# Patient Record
Sex: MALE | Race: WHITE | NOT HISPANIC OR LATINO | Employment: UNEMPLOYED | URBAN - METROPOLITAN AREA
[De-identification: names, ages, dates, MRNs, and addresses within clinical notes are randomized per-mention and may not be internally consistent; named-entity substitution may affect disease eponyms.]

---

## 2022-05-10 ENCOUNTER — TELEPHONE (OUTPATIENT)
Dept: INTERNAL MEDICINE | Facility: CLINIC | Age: 52
End: 2022-05-10

## 2022-05-10 ENCOUNTER — OFFICE VISIT (OUTPATIENT)
Dept: INTERNAL MEDICINE | Facility: CLINIC | Age: 52
End: 2022-05-10
Payer: MEDICARE

## 2022-05-10 ENCOUNTER — LAB VISIT (OUTPATIENT)
Dept: LAB | Facility: HOSPITAL | Age: 52
End: 2022-05-10
Attending: INTERNAL MEDICINE
Payer: MEDICARE

## 2022-05-10 ENCOUNTER — TELEPHONE (OUTPATIENT)
Dept: INTERNAL MEDICINE | Facility: CLINIC | Age: 52
End: 2022-05-10
Payer: MEDICARE

## 2022-05-10 VITALS
HEART RATE: 96 BPM | OXYGEN SATURATION: 94 % | WEIGHT: 315 LBS | DIASTOLIC BLOOD PRESSURE: 60 MMHG | SYSTOLIC BLOOD PRESSURE: 147 MMHG | HEIGHT: 69 IN | BODY MASS INDEX: 46.65 KG/M2

## 2022-05-10 DIAGNOSIS — C92.10 CML (CHRONIC MYELOCYTIC LEUKEMIA): ICD-10-CM

## 2022-05-10 DIAGNOSIS — J43.8 OTHER EMPHYSEMA: ICD-10-CM

## 2022-05-10 DIAGNOSIS — E11.8 TYPE 2 DIABETES WITH COMPLICATION: ICD-10-CM

## 2022-05-10 DIAGNOSIS — I73.9 CLAUDICATION: Primary | ICD-10-CM

## 2022-05-10 DIAGNOSIS — I10 HTN (HYPERTENSION), BENIGN: ICD-10-CM

## 2022-05-10 PROBLEM — J44.9 COPD (CHRONIC OBSTRUCTIVE PULMONARY DISEASE): Status: ACTIVE | Noted: 2022-05-10

## 2022-05-10 PROBLEM — E11.9 TYPE 2 DIABETES MELLITUS, WITHOUT LONG-TERM CURRENT USE OF INSULIN: Status: ACTIVE | Noted: 2022-05-10

## 2022-05-10 LAB
ALBUMIN SERPL BCP-MCNC: 3.5 G/DL (ref 3.5–5.2)
ALP SERPL-CCNC: 144 U/L (ref 55–135)
ALT SERPL W/O P-5'-P-CCNC: 27 U/L (ref 10–44)
ANION GAP SERPL CALC-SCNC: 11 MMOL/L (ref 8–16)
AST SERPL-CCNC: 23 U/L (ref 10–40)
BASOPHILS NFR BLD: 1 % (ref 0–1.9)
BILIRUB SERPL-MCNC: 0.3 MG/DL (ref 0.1–1)
BUN SERPL-MCNC: 18 MG/DL (ref 6–20)
CALCIUM SERPL-MCNC: 10.4 MG/DL (ref 8.7–10.5)
CHLORIDE SERPL-SCNC: 99 MMOL/L (ref 95–110)
CO2 SERPL-SCNC: 27 MMOL/L (ref 23–29)
CREAT SERPL-MCNC: 1.6 MG/DL (ref 0.5–1.4)
DIFFERENTIAL METHOD: ABNORMAL
EOSINOPHIL NFR BLD: 1 % (ref 0–8)
ERYTHROCYTE [DISTWIDTH] IN BLOOD BY AUTOMATED COUNT: 15.6 % (ref 11.5–14.5)
EST. GFR  (AFRICAN AMERICAN): 57.2 ML/MIN/1.73 M^2
EST. GFR  (NON AFRICAN AMERICAN): 49.5 ML/MIN/1.73 M^2
ESTIMATED AVG GLUCOSE: 255 MG/DL (ref 68–131)
GLUCOSE SERPL-MCNC: 328 MG/DL (ref 70–110)
HBA1C MFR BLD: 10.5 % (ref 4–5.6)
HCT VFR BLD AUTO: 48.5 % (ref 40–54)
HGB BLD-MCNC: 15.5 G/DL (ref 14–18)
IMM GRANULOCYTES # BLD AUTO: ABNORMAL K/UL (ref 0–0.04)
IMM GRANULOCYTES NFR BLD AUTO: ABNORMAL % (ref 0–0.5)
LYMPHOCYTES NFR BLD: 9 % (ref 18–48)
MCH RBC QN AUTO: 30.1 PG (ref 27–31)
MCHC RBC AUTO-ENTMCNC: 32 G/DL (ref 32–36)
MCV RBC AUTO: 94 FL (ref 82–98)
METAMYELOCYTES NFR BLD MANUAL: 5 %
MONOCYTES NFR BLD: 6 % (ref 4–15)
MYELOCYTES NFR BLD MANUAL: 9 %
NEUTROPHILS NFR BLD: 58 % (ref 38–73)
NEUTS BAND NFR BLD MANUAL: 6 %
NRBC BLD-RTO: 0 /100 WBC
PLATELET # BLD AUTO: 203 K/UL (ref 150–450)
PLATELET BLD QL SMEAR: ABNORMAL
PMV BLD AUTO: 12.9 FL (ref 9.2–12.9)
POLYCHROMASIA BLD QL SMEAR: ABNORMAL
POTASSIUM SERPL-SCNC: 4.4 MMOL/L (ref 3.5–5.1)
PROMYELOCYTES NFR BLD MANUAL: 3 %
PROT SERPL-MCNC: 6.7 G/DL (ref 6–8.4)
RBC # BLD AUTO: 5.15 M/UL (ref 4.6–6.2)
SODIUM SERPL-SCNC: 137 MMOL/L (ref 136–145)
WBC # BLD AUTO: 51.61 K/UL (ref 3.9–12.7)
WBC OTHER NFR BLD MANUAL: 2 %

## 2022-05-10 PROCEDURE — 99203 OFFICE O/P NEW LOW 30 MIN: CPT | Mod: S$GLB,,, | Performed by: INTERNAL MEDICINE

## 2022-05-10 PROCEDURE — 83036 HEMOGLOBIN GLYCOSYLATED A1C: CPT | Performed by: INTERNAL MEDICINE

## 2022-05-10 PROCEDURE — 99999 PR PBB SHADOW E&M-NEW PATIENT-LVL V: ICD-10-PCS | Mod: PBBFAC,,, | Performed by: INTERNAL MEDICINE

## 2022-05-10 PROCEDURE — 36415 COLL VENOUS BLD VENIPUNCTURE: CPT | Performed by: INTERNAL MEDICINE

## 2022-05-10 PROCEDURE — 3078F PR MOST RECENT DIASTOLIC BLOOD PRESSURE < 80 MM HG: ICD-10-PCS | Mod: CPTII,S$GLB,, | Performed by: INTERNAL MEDICINE

## 2022-05-10 PROCEDURE — 99499 UNLISTED E&M SERVICE: CPT | Mod: S$GLB,,, | Performed by: INTERNAL MEDICINE

## 2022-05-10 PROCEDURE — 1160F RVW MEDS BY RX/DR IN RCRD: CPT | Mod: CPTII,S$GLB,, | Performed by: INTERNAL MEDICINE

## 2022-05-10 PROCEDURE — 85060 BLOOD SMEAR INTERPRETATION: CPT | Mod: ,,, | Performed by: PATHOLOGY

## 2022-05-10 PROCEDURE — 85027 COMPLETE CBC AUTOMATED: CPT | Performed by: INTERNAL MEDICINE

## 2022-05-10 PROCEDURE — 3077F SYST BP >= 140 MM HG: CPT | Mod: CPTII,S$GLB,, | Performed by: INTERNAL MEDICINE

## 2022-05-10 PROCEDURE — 80053 COMPREHEN METABOLIC PANEL: CPT | Performed by: INTERNAL MEDICINE

## 2022-05-10 PROCEDURE — 3077F PR MOST RECENT SYSTOLIC BLOOD PRESSURE >= 140 MM HG: ICD-10-PCS | Mod: CPTII,S$GLB,, | Performed by: INTERNAL MEDICINE

## 2022-05-10 PROCEDURE — 85007 BL SMEAR W/DIFF WBC COUNT: CPT | Performed by: INTERNAL MEDICINE

## 2022-05-10 PROCEDURE — 85060 PATHOLOGIST REVIEW: ICD-10-PCS | Mod: ,,, | Performed by: PATHOLOGY

## 2022-05-10 PROCEDURE — 99999 PR PBB SHADOW E&M-NEW PATIENT-LVL V: CPT | Mod: PBBFAC,,, | Performed by: INTERNAL MEDICINE

## 2022-05-10 PROCEDURE — 1159F PR MEDICATION LIST DOCUMENTED IN MEDICAL RECORD: ICD-10-PCS | Mod: CPTII,S$GLB,, | Performed by: INTERNAL MEDICINE

## 2022-05-10 PROCEDURE — 1159F MED LIST DOCD IN RCRD: CPT | Mod: CPTII,S$GLB,, | Performed by: INTERNAL MEDICINE

## 2022-05-10 PROCEDURE — 99499 RISK ADDL DX/OHS AUDIT: ICD-10-PCS | Mod: S$GLB,,, | Performed by: INTERNAL MEDICINE

## 2022-05-10 PROCEDURE — 1160F PR REVIEW ALL MEDS BY PRESCRIBER/CLIN PHARMACIST DOCUMENTED: ICD-10-PCS | Mod: CPTII,S$GLB,, | Performed by: INTERNAL MEDICINE

## 2022-05-10 PROCEDURE — 3008F BODY MASS INDEX DOCD: CPT | Mod: CPTII,S$GLB,, | Performed by: INTERNAL MEDICINE

## 2022-05-10 PROCEDURE — 99203 PR OFFICE/OUTPT VISIT, NEW, LEVL III, 30-44 MIN: ICD-10-PCS | Mod: S$GLB,,, | Performed by: INTERNAL MEDICINE

## 2022-05-10 PROCEDURE — 4010F ACE/ARB THERAPY RXD/TAKEN: CPT | Mod: CPTII,S$GLB,, | Performed by: INTERNAL MEDICINE

## 2022-05-10 PROCEDURE — 3078F DIAST BP <80 MM HG: CPT | Mod: CPTII,S$GLB,, | Performed by: INTERNAL MEDICINE

## 2022-05-10 PROCEDURE — 4010F PR ACE/ARB THEARPY RXD/TAKEN: ICD-10-PCS | Mod: CPTII,S$GLB,, | Performed by: INTERNAL MEDICINE

## 2022-05-10 PROCEDURE — 3008F PR BODY MASS INDEX (BMI) DOCUMENTED: ICD-10-PCS | Mod: CPTII,S$GLB,, | Performed by: INTERNAL MEDICINE

## 2022-05-10 RX ORDER — METFORMIN HYDROCHLORIDE 500 MG/1
500 TABLET ORAL 2 TIMES DAILY WITH MEALS
COMMUNITY

## 2022-05-10 RX ORDER — LISINOPRIL 10 MG/1
10 TABLET ORAL DAILY
COMMUNITY

## 2022-05-10 RX ORDER — POTASSIUM CHLORIDE 750 MG/1
10 TABLET, EXTENDED RELEASE ORAL ONCE
COMMUNITY

## 2022-05-10 RX ORDER — GABAPENTIN 300 MG/1
300 CAPSULE ORAL 3 TIMES DAILY
Qty: 30 CAPSULE | Refills: 0 | Status: SHIPPED | OUTPATIENT
Start: 2022-05-10

## 2022-05-10 RX ORDER — SEMAGLUTIDE 1.34 MG/ML
INJECTION, SOLUTION SUBCUTANEOUS
COMMUNITY

## 2022-05-10 RX ORDER — DAPAGLIFLOZIN 10 MG/1
10 TABLET, FILM COATED ORAL DAILY
COMMUNITY

## 2022-05-10 RX ORDER — FUROSEMIDE 20 MG/1
20 TABLET ORAL DAILY
COMMUNITY

## 2022-05-10 RX ORDER — ATENOLOL 25 MG/1
25 TABLET ORAL DAILY
COMMUNITY

## 2022-05-10 NOTE — TELEPHONE ENCOUNTER
Explained new appointments info and gave pt the AVS, asked him to f/u w/ Pulmonology staff for an appointment sooner than August.

## 2022-05-10 NOTE — PROGRESS NOTES
"Subjective:       Patient ID: Sarbjit Stevens is a 51 y.o. male.    Chief Complaint: Leg Swelling, Shortness of Breath, and Leg Pain      Pt and problems are new to me.    Sarbjit Stevens is 51 y.o. male who presents for with burning leg pain with walking X 4 years worse over last 1 month.  Pt was evaluated by vascular surgery and had evaluation with normal US legs but was told MRI with dye.      Pt has Type II DM, HTN, COPD, and CML.  Pt moved here from [place] and has not been seen by primary care provider since he has been in [place].  Pt stopped all his medication in Nov 2021 and restarted them 5 days ago because he was tired of being a "druggy".  Pt states he just saw an eye doctor who told him that he had diabetic retinopathy.  Pt is not monitoring his blood sugar because he broke his glucometer.        Review of Systems   Constitutional: Positive for chills. Negative for fever. Diaphoresis: at night X 1 month.   Respiratory: Positive for shortness of breath (at rest and on exertion, chronic, worse over last month).    Cardiovascular: Positive for leg swelling (bilateral).   Gastrointestinal: Negative for abdominal pain, constipation, diarrhea, nausea and vomiting.         Objective:      Physical Exam  Vitals reviewed.   Constitutional:       General: He is awake.      Appearance: He is morbidly obese.   HENT:      Head: Normocephalic and atraumatic.      Mouth/Throat:      Mouth: Mucous membranes are moist.      Pharynx: Oropharynx is clear.   Eyes:      Extraocular Movements: Extraocular movements intact.      Conjunctiva/sclera: Conjunctivae normal.      Pupils: Pupils are equal, round, and reactive to light.   Cardiovascular:      Rate and Rhythm: Normal rate and regular rhythm.      Heart sounds: No murmur heard.    No friction rub. No gallop.   Pulmonary:      Effort: Pulmonary effort is normal.      Breath sounds: Normal breath sounds.   Abdominal:      General: Bowel sounds are normal. There is no " distension.      Tenderness: There is no abdominal tenderness. There is no guarding or rebound.   Musculoskeletal:      Right lower leg: Edema (Trace) present.      Left lower leg: Edema (trace) present.   Lymphadenopathy:      Cervical: No cervical adenopathy.   Neurological:      Mental Status: He is alert and oriented to person, place, and time.   Psychiatric:         Mood and Affect: Mood normal.         Behavior: Behavior is cooperative.         Assessment:       1. Claudication    2. HTN (hypertension), benign    3. CML (chronic myelocytic leukemia)    4. Other emphysema    5. Type 2 diabetes with complication        Plan:     Pt with multiple medical problems who has been unable to establish primary care.  Pt was set up for appointment with possible PCP on Monday, April 16.  Pt c/o of LE numbness and pain affecting his walking.  Pt was being evaluated by vascular surgery in [place] prior to moving but did not complete evaluation. Will refer to vascular surgery for further evaluation.  Pt's leg pain is most likely secondary to DM peripheral neuropathy.  Will tx with gabapentin 300 mg po QHS.      Type II DM:  Pt with type II DM but stopped all his medication in Nov and restarted them 5 days ago.  Pt counseled that stopping his medication would lead to uncontrolled DM causing all the DM complications and could lead to death.  Will check HgbA1C so lab is available for PCP apt.    HTN:  Pt stopped BP medication and just restarted it 5 days ago.  BP can be recheck at PCP apt when pt has been on medication for longer period of time.      CML:  Pt report hx of leukemia most likely CML.  Will get CBC and CMP for further evaluation.  Pt can be referred to heme/onc by PCP.    COPD:  Pt with know moderate COPD with complaint of worsening SOB over the last month.  Lungs today are CTA so pt is not having COPD exacerbation.  Could be worsening of baseline disease vs patient stopping his medication.  Pt referred to  pulmonary for further evaluation and treatment.      Patient was advised to follow up if symptom are not improving or worsened.      Sarbjit was seen today for leg swelling, shortness of breath and leg pain.    Diagnoses and all orders for this visit:    Claudication  -     Ambulatory referral/consult to Vascular Surgery; Future    HTN (hypertension), benign    CML (chronic myelocytic leukemia)  -     CBC Auto Differential; Future  -     Comprehensive Metabolic Panel; Future    Other emphysema  -     Ambulatory referral/consult to Pulmonology; Future    Type 2 diabetes with complication  -     gabapentin (NEURONTIN) 300 MG capsule; Take 1 capsule (300 mg total) by mouth 3 (three) times daily.  -     Hemoglobin A1C; Future

## 2022-05-10 NOTE — PROGRESS NOTES
All appointments were scheduled and will follow w/ Pulmonology dept to try to get pt in before August 2022.

## 2022-05-11 ENCOUNTER — TELEPHONE (OUTPATIENT)
Dept: PULMONOLOGY | Facility: CLINIC | Age: 52
End: 2022-05-11
Payer: MEDICARE

## 2022-05-11 ENCOUNTER — TELEPHONE (OUTPATIENT)
Dept: HEMATOLOGY/ONCOLOGY | Facility: CLINIC | Age: 52
End: 2022-05-11
Payer: MEDICARE

## 2022-05-11 ENCOUNTER — INITIAL CONSULT (OUTPATIENT)
Dept: VASCULAR SURGERY | Facility: CLINIC | Age: 52
End: 2022-05-11
Payer: MEDICARE

## 2022-05-11 ENCOUNTER — PATIENT MESSAGE (OUTPATIENT)
Dept: INTERNAL MEDICINE | Facility: CLINIC | Age: 52
End: 2022-05-11
Payer: MEDICARE

## 2022-05-11 VITALS
HEIGHT: 69 IN | DIASTOLIC BLOOD PRESSURE: 78 MMHG | SYSTOLIC BLOOD PRESSURE: 150 MMHG | WEIGHT: 315 LBS | BODY MASS INDEX: 46.65 KG/M2

## 2022-05-11 DIAGNOSIS — C92.10 CML (CHRONIC MYELOCYTIC LEUKEMIA): Primary | ICD-10-CM

## 2022-05-11 DIAGNOSIS — I73.9 CLAUDICATION: ICD-10-CM

## 2022-05-11 DIAGNOSIS — I87.2 VENOUS INSUFFICIENCY: ICD-10-CM

## 2022-05-11 LAB — PATH REV BLD -IMP: NORMAL

## 2022-05-11 PROCEDURE — 99999 PR PBB SHADOW E&M-EST. PATIENT-LVL III: ICD-10-PCS | Mod: PBBFAC,,, | Performed by: SURGERY

## 2022-05-11 PROCEDURE — 99999 PR PBB SHADOW E&M-EST. PATIENT-LVL III: CPT | Mod: PBBFAC,,, | Performed by: SURGERY

## 2022-05-11 PROCEDURE — 99204 PR OFFICE/OUTPT VISIT, NEW, LEVL IV, 45-59 MIN: ICD-10-PCS | Mod: S$GLB,,, | Performed by: SURGERY

## 2022-05-11 PROCEDURE — 99204 OFFICE O/P NEW MOD 45 MIN: CPT | Mod: S$GLB,,, | Performed by: SURGERY

## 2022-05-11 NOTE — TELEPHONE ENCOUNTER
Tc to pt  Spoke w/ spouse Informed her that we received a referral to evaluate pt for CLL  Nurse inquired as to where was previous treatment received so we may obtain a copy of records prior to scheduling He was treated in [place] Requested that patient come into office any day between 8:00-4:00 to sign a release of info        Reminder call to patient to sign release of information so we may obtain his records from [place]  And schedule him for an evaluation he stated his wife is coming in today to sign release

## 2022-05-11 NOTE — TELEPHONE ENCOUNTER
Scheduled an appointment to see provider.    JALEEL Diaz              ----- Message from Emily Perez MA sent at 5/11/2022  7:53 AM CDT -----  Please schedule pt for a sooner appointment. Thank you Jazmín

## 2022-05-11 NOTE — PROGRESS NOTES
Corey García MD VI                       [facility]                         05/11/2022    HPI:  Sarbjit Stevens is a 51 y.o. male with   Patient Active Problem List   Diagnosis    HTN (hypertension), benign    CML (chronic myelocytic leukemia)    COPD (chronic obstructive pulmonary disease)    Type 2 diabetes mellitus, without long-term current use of insulin    being managed by PCP and specialists who is here today for evaluation of PVD.  Patient has complaints of leg pain at rest and when walking/standing.  Patient states location is BLE occurring for months.  Associated signs and symptoms include pain, spider veins.  Quality is heavy and severity is 7/10.  Symptoms began mo ago.  Alleviating factors include elevation.  Worsening factors include dependency.  no rest pain.  no tissue loss.  Patient is diabetic.  Is not on Pletal.  no previous lower extremity interventions.    no MI  no Stroke  Tobacco use: yes  Daily Aspirin: yes  Anticoagulation: no    No past medical history on file.  No past surgical history on file.  No family history on file.  Social History     Socioeconomic History    Marital status:    Tobacco Use    Smoking status: Heavy Tobacco Smoker     Packs/day: 3.00     Years: 48.00     Pack years: 144.00     Start date: 1/1/1973    Smokeless tobacco: Never Used       Current Outpatient Medications:     furosemide (LASIX) 20 MG tablet, Take 20 mg by mouth once daily. Pt states he is supposed to take twice per day but takes it once per day, Disp: , Rfl:     gabapentin (NEURONTIN) 300 MG capsule, Take 1 capsule (300 mg total) by mouth 3 (three) times daily., Disp: 30 capsule, Rfl: 0    lisinopriL 10 MG tablet, Take 10 mg by mouth once daily., Disp: , Rfl:     metFORMIN (GLUCOPHAGE) 500 MG tablet, Take 500 mg by mouth 2 (two) times daily with meals., Disp: , Rfl:     potassium chloride (KLOR-CON) 10 MEQ TbSR, Take 10 mEq by mouth once., Disp: , Rfl:      semaglutide (OZEMPIC) 0.25 mg or 0.5 mg(2 mg/1.5 mL) pen injector, Inject into the skin every 7 days., Disp: , Rfl:     atenoloL (TENORMIN) 25 MG tablet, Take 25 mg by mouth once daily., Disp: , Rfl:     dapagliflozin (FARXIGA) 10 mg tablet, Take 10 mg by mouth once daily., Disp: , Rfl:     dasatinib (SPRYCEL) 140 mg Tab, Take 140 mg by mouth., Disp: , Rfl:     REVIEW OF SYSTEMS:  General: No fevers or chills; ENT: No sore throat; Allergy and Immunology: no persistent infections; Hematological and Lymphatic: No history of bleeding or easy bruising; Endocrine: negative; Respiratory: no cough, shortness of breath, or wheezing; Cardiovascular: no chest pain or dyspnea on exertion; + venous claudication, no rest pain; Gastrointestinal: no abdominal pain/back, change in bowel habits, or bloody stools; Genito-Urinary: no dysuria, trouble voiding, or hematuria; Musculoskeletal: negative, no wound; Neurological: no TIA or stroke symptoms; Psychiatric: no nervousness, anxiety or depression.    PHYSICAL EXAM:                General appearance:  Alert, well-appearing, and in no distress.  Oriented to person, place, and time                    Neurological: Normal speech, no focal findings noted; CN II - XII grossly intact. RLE with sensation to light touch, LLE with sensation to light touch.            Musculoskeletal: Digits/nail without cyanosis/clubbing.  Strength 5/5 BLE.                    Neck: Supple, no significant adenopathy, no carotid bruit can be auscultated                  Chest:  Clear to auscultation, no wheezes, rales or rhonchi, symmetric air entry. No use of accessory muscles               Cardiac: Normal rate and regular rhythm, S1 and S2 normal            Abdomen: Soft, nontender, nondistended, no masses or organomegaly, no hernia     No rebound tenderness noted; bowel sounds normal     Pulsatile aortic mass is non palpable.     No groin adenopathy      Extremities:     2+ R DP pulse, 2+ L DP  pulse     2+ RLE edema, 2+ LLE edema    Skin: RLE no tissue loss; LLE no tissue loss    LAB RESULTS:  No results found for: CBC  No results found for: LABPROT, INR  Lab Results   Component Value Date     05/10/2022    K 4.4 05/10/2022    CL 99 05/10/2022    CO2 27 05/10/2022     (H) 05/10/2022    BUN 18 05/10/2022    CREATININE 1.6 (H) 05/10/2022    CALCIUM 10.4 05/10/2022    ANIONGAP 11 05/10/2022    EGFRNONAA 49.5 (A) 05/10/2022     Lab Results   Component Value Date    WBC 51.61 (HH) 05/10/2022    RBC 5.15 05/10/2022    HGB 15.5 05/10/2022    HCT 48.5 05/10/2022    MCV 94 05/10/2022    MCH 30.1 05/10/2022    MCHC 32.0 05/10/2022    RDW 15.6 (H) 05/10/2022     05/10/2022    MPV 12.9 05/10/2022    GRAN 58.0 05/10/2022    LYMPH 9.0 (L) 05/10/2022    MONO 6.0 05/10/2022    EOSINOPHIL 1.0 05/10/2022    BASOPHIL 1.0 05/10/2022    DIFFMETHOD Manual 05/10/2022     .  Lab Results   Component Value Date    HGBA1C 10.5 (H) 05/10/2022       IMAGING:  All pertinent imaging has been reviewed and interpreted independently.    IMP/PLAN:  51 y.o. male with   Patient Active Problem List   Diagnosis    HTN (hypertension), benign    CML (chronic myelocytic leukemia)    COPD (chronic obstructive pulmonary disease)    Type 2 diabetes mellitus, without long-term current use of insulin    being managed by PCP and specialists who is here today for evaluation of PVD.    -Minimal concern for PVD as etiology of symptoms, appear to be due to venous and lymphatic etiology with venous claudication, no rest pain, no wound.  Imaging reviewed. - recommend compression with stockings, elevation, dietary changes associated with water and sodium intake discussed at length with patient  -Patient has secondary lymphedema and has tried and failed compression of 20-30 mm Hg, elevation and exercise for >1 month period however symptoms persist.  Basic pump trial performed and discontinued due to sensitivity and pain to static  pressure.  Symptoms of swelling, pain and hyperplasia present.  A compression device is recommended to treat current symptoms and prevent disease progression. - recommend lymphedema clinic therapy and pumps  -Arterial BLE US and NISHA/TP  -Exercise  -Heart healthy lifestyle  -RTC 3 mo    I spent 12 minutes evaluating this patient and greater than 50% of the time was spent counseling, coordinator care and discussing the plan of care.  All questions were answered and patient stated understanding with agreement with the above treatment plan.    Corey García MD [facility]  Vascular and Endovascular Surgery

## 2022-05-11 NOTE — PROGRESS NOTES
Patient, Sarbjit Stevens (MRN #[id number]), presented with a recorded BMI of 47.52 kg/m^2 consistent with the definition of morbid obesity (ICD-10 E66.01). The patient's morbid obesity was monitored, evaluated, addressed and/or treated. This addendum to the medical record is made on 05/11/2022.

## 2022-05-11 NOTE — TELEPHONE ENCOUNTER
Patient with ?hx of CML per his and wife's report.  WBC 58 on labs yesterday.  Pt was referred to Heme/onc based on critical lab.  Pt also with HgbA1C 10 and has apt with new PCP on May 16.

## 2022-05-16 ENCOUNTER — TELEPHONE (OUTPATIENT)
Dept: HEMATOLOGY/ONCOLOGY | Facility: CLINIC | Age: 52
End: 2022-05-16
Payer: MEDICARE

## 2022-05-16 NOTE — TELEPHONE ENCOUNTER
TC to spouse  In am  Phone not taking calls    TC to pt spouse in pm Not taking calls   \    TC to pt  He prefers we call spouse with everything but since we cannot reach spouse I called and left a VM for him to have spouse to contact office as the information she gave for me to send release of information is an incorrect number     Letter mailed to pt on this date to request they obtain correct fax and phone number for the facility in [place] so we may forward release of information

## 2022-05-17 ENCOUNTER — TELEPHONE (OUTPATIENT)
Dept: FAMILY MEDICINE | Facility: CLINIC | Age: 52
End: 2022-05-17
Payer: MEDICARE

## 2022-05-17 NOTE — TELEPHONE ENCOUNTER
Called pt to advise about needing a clearance to be cleared for dental services. spoke with pt wife. Advise wife that patient needs to be seen told pt wife what available appointments we have next. Wife got upset and started cursing at me and stated how she hates [facility] and that we are all F''''ing stupid and that she (used foul language again) hates [facility] so much and that her  hates [facility] to and hung up.      Advised pt that  missed appointment on 5/16/22 and she stated that she thought the appointment was for 3:30 when appointment was actually for 3:20 pm. Pt and wife was advised to reschedule being past [place] period. And apparently did not rescheduled.

## 2022-06-03 ENCOUNTER — HOSPITAL ENCOUNTER (EMERGENCY)
Facility: HOSPITAL | Age: 52
Discharge: HOME | End: 2022-06-03
Attending: EMERGENCY MEDICINE
Payer: MEDICARE

## 2022-06-03 VITALS
DIASTOLIC BLOOD PRESSURE: 81 MMHG | SYSTOLIC BLOOD PRESSURE: 141 MMHG | WEIGHT: 310 LBS | HEIGHT: 69 IN | TEMPERATURE: 98 F | RESPIRATION RATE: 16 BRPM | HEART RATE: 80 BPM | BODY MASS INDEX: 45.91 KG/M2 | OXYGEN SATURATION: 95 %

## 2022-06-03 DIAGNOSIS — M54.41 ACUTE RIGHT-SIDED LOW BACK PAIN WITH RIGHT-SIDED SCIATICA: Primary | ICD-10-CM

## 2022-06-03 PROCEDURE — 63600175 PHARM REV CODE 636 W HCPCS: Performed by: EMERGENCY MEDICINE

## 2022-06-03 PROCEDURE — 25000003 PHARM REV CODE 250: Performed by: EMERGENCY MEDICINE

## 2022-06-03 PROCEDURE — 99284 EMERGENCY DEPT VISIT MOD MDM: CPT | Mod: 25

## 2022-06-03 RX ORDER — ACETAMINOPHEN 500 MG
1000 TABLET ORAL EVERY 6 HOURS PRN
Qty: 20 TABLET | Refills: 0 | Status: SHIPPED | OUTPATIENT
Start: 2022-06-03

## 2022-06-03 RX ORDER — LIDOCAINE 50 MG/G
1 PATCH TOPICAL DAILY
Qty: 14 PATCH | Refills: 0 | Status: SHIPPED | OUTPATIENT
Start: 2022-06-03

## 2022-06-03 RX ORDER — CYCLOBENZAPRINE HCL 10 MG
10 TABLET ORAL 3 TIMES DAILY PRN
Qty: 20 TABLET | Refills: 0 | Status: SHIPPED | OUTPATIENT
Start: 2022-06-03 | End: 2022-06-03 | Stop reason: SDUPTHER

## 2022-06-03 RX ORDER — LIDOCAINE 50 MG/G
1 PATCH TOPICAL ONCE
Status: DISCONTINUED | OUTPATIENT
Start: 2022-06-03 | End: 2022-06-03 | Stop reason: HOSPADM

## 2022-06-03 RX ORDER — DEXAMETHASONE 4 MG/1
4 TABLET ORAL
Status: COMPLETED | OUTPATIENT
Start: 2022-06-03 | End: 2022-06-03

## 2022-06-03 RX ORDER — NAPROXEN 500 MG/1
500 TABLET ORAL 2 TIMES DAILY PRN
Qty: 20 TABLET | Refills: 0 | Status: SHIPPED | OUTPATIENT
Start: 2022-06-03

## 2022-06-03 RX ORDER — CYCLOBENZAPRINE HCL 10 MG
10 TABLET ORAL ONCE
Status: COMPLETED | OUTPATIENT
Start: 2022-06-03 | End: 2022-06-03

## 2022-06-03 RX ORDER — OXYCODONE AND ACETAMINOPHEN 10; 325 MG/1; MG/1
1 TABLET ORAL
Status: COMPLETED | OUTPATIENT
Start: 2022-06-03 | End: 2022-06-03

## 2022-06-03 RX ORDER — CYCLOBENZAPRINE HCL 10 MG
10 TABLET ORAL 3 TIMES DAILY PRN
Qty: 20 TABLET | Refills: 0 | Status: SHIPPED | OUTPATIENT
Start: 2022-06-03 | End: 2022-06-08

## 2022-06-03 RX ADMIN — CYCLOBENZAPRINE 10 MG: 10 TABLET, FILM COATED ORAL at 11:06

## 2022-06-03 RX ADMIN — OXYCODONE AND ACETAMINOPHEN 1 TABLET: 10; 325 TABLET ORAL at 11:06

## 2022-06-03 RX ADMIN — LIDOCAINE 1 PATCH: 50 PATCH TOPICAL at 11:06

## 2022-06-03 RX ADMIN — DEXAMETHASONE 4 MG: 4 TABLET ORAL at 11:06

## 2022-06-03 NOTE — ED PROVIDER NOTES
------------------------------------------------------------------------------------------------------------------  Ness CHAVEZ, have reviewed the SORT/triage note.      History     Chief Complaint   Patient presents with    Fall     Pt to ER with c/o left sided hip pain radiating down leg s/p slip and fall two days ago. Pt denies hitting head when calling. No LOC.        HPI: 51 y.o. male presents to the ED with a chief complaint of back pain s/p fall in the grocery store 2 days ago. The patient reports pushing his cart in the store when he stepped on a brass plate with his right foot, leading to the brass plate lifting up and his right leg to twist. The patient complains of right sided lower back pain that starts around his hip region and radiates down to his right leg. Reports attempted treatment with epsom salt with mild relief, but notes that the pain worsened yesterday despite treatment. Reports attempting to go to the ED last night but left due to wait times, and states that his PCP advised him to come to the ED this morning. No exacerbating or alleviating factors noted. No other associated symptoms.     There is no report of urinary or bowel incontinence, f/c, n/v/d/c, IVDU, abdominal pain weakness, or sensory deficit.      PMHx   Diagnosis   Type 2 diabetes mellitus with diabetic polyneuropathy, without long-term current use of insulin (CMS/[facility])     Essential hypertension    Unspecified essential hypertension     Mixed hyperlipidemia     Chronic systolic congestive heart failure (CMS/[facility])    Chronic systolic heart failure     Stage 3a chronic kidney disease (CMS/[facility])     Panlobular emphysema (CMS/[facility])    Other emphysema     CML (chronic myelocytic leukemia) (CMS/[facility])    Chronic myeloid leukemia, without mention of having achieved remission     Tobacco abuse    Tobacco use disorder     Venous stasis dermatitis of both lower extremities     Gastroesophageal reflux disease without esophagitis     Esophageal reflux     Special screening, prostate cancer    Special screening for malignant neoplasm of prostate     Vitamin D deficiency disease    Unspecified vitamin D deficiency       Diagnosis   Type 2 diabetes mellitus with diabetic polyneuropathy, without long-term current use of insulin (CMS/HCC)     Essential hypertension    Unspecified essential hypertension     Mixed hyperlipidemia     Chronic systolic congestive heart failure (CMS/HCC)    Chronic systolic heart failure     Stage 3a chronic kidney disease (CMS/HCC)     Panlobular emphysema (CMS/HCC)    Other emphysema     CML (chronic myelocytic leukemia) (CMS/HCC)    Chronic myeloid leukemia, without mention of having achieved remission     Tobacco abuse    Tobacco use disorder     Venous stasis dermatitis of both lower extremities     Gastroesophageal reflux disease without esophagitis    Esophageal reflux     Special screening, prostate cancer    Special screening for malignant neoplasm of prostate     Vitamin D deficiency disease    Unspecified vitamin D deficiency         History reviewed. No pertinent surgical history.  Social History     Tobacco Use    Smoking status: Heavy Tobacco Smoker     Packs/day: 3.00     Years: 48.00     Pack years: 144.00     Start date: 1/1/1973    Smokeless tobacco: Never Used   Substance Use Topics    Alcohol use: Not Currently    Drug use: Not Currently     History reviewed. No pertinent family history.  Review of patient's allergies indicates:   Allergen Reactions    Opioids - morphine analogues     Penicillins     Sulfa (sulfonamide antibiotics)        Current Outpatient Medications:     atenoloL (TENORMIN) 25 MG tablet, Take 25 mg by mouth once daily., Disp: , Rfl:     dapagliflozin (FARXIGA) 10 mg tablet, Take 10 mg by mouth once daily., Disp: , Rfl:     dasatinib (SPRYCEL) 140 mg Tab, Take 140 mg by mouth., Disp: , Rfl:     furosemide (LASIX) 20 MG tablet, Take 20 mg by mouth once daily. Pt states he  "is supposed to take twice per day but takes it once per day, Disp: , Rfl:     gabapentin (NEURONTIN) 300 MG capsule, Take 1 capsule (300 mg total) by mouth 3 (three) times daily., Disp: 30 capsule, Rfl: 0    lisinopriL 10 MG tablet, Take 10 mg by mouth once daily., Disp: , Rfl:     metFORMIN (GLUCOPHAGE) 500 MG tablet, Take 500 mg by mouth 2 (two) times daily with meals., Disp: , Rfl:     potassium chloride (KLOR-CON) 10 MEQ TbSR, Take 10 mEq by mouth once., Disp: , Rfl:     semaglutide (OZEMPIC) 0.25 mg or 0.5 mg(2 mg/1.5 mL) pen injector, Inject into the skin every 7 days., Disp: , Rfl:      Review of Systems as per HPI  ROS  Constitutional: Negative for activity change, appetite change, fatigue, diaphoresis, chills and fever.   Respiratory: Negative for apnea, choking, chest tightness and wheezing.    Gastrointestinal: Negative for abdominal distention, constipation, diarrhea and nausea.   Genitourinary: Negative for dysuria, flank pain, frequency and hematuria.   Skin: Negative for color change, pallor, rash and wound.   Neurological: Negative for light-headedness, numbness, dizziness and headaches.   Musculoskeletal: Positive for back pain.   Psychiatric/Behavioral: Negative for agitation, behavioral problems, confusion, decreased concentration and dysphoric mood.     All other systems reviewed and are negative.    Physical Exam     ED Triage Vitals [06/03/22 1045]   Enc Vitals Group      BP (!) 161/78      Pulse 87      Resp 18      Temp 98 °F (36.7 °C)      Temp src Oral      SpO2 95 %      Weight (!) 310 lb      Height 5' 9"      Head Circumference       Peak Flow       Pain Score       Pain Loc       Pain Edu?       Excl. in GC?      PHYSICAL EXAMINATION:  Vital signs and nursing assessment noted: elevated bp  GEN:   NAD, A & Ox3, atraumatic, well appearing, nontoxic appearing, obese   HEENT:  PERRLA, EOMI, moist membranes, nl conjunctiva, no scleral icterus, no nystagmus, no nodes/nodules, soft, " supple, FROM, no trachial deviation  CV:   2+ radial pulses, <2sec cap refill, no obvious JVD  RESP:  No obvious wheezing or stridor, equal and bilateral chest rise, no respiratory distress  ABD:   soft, Nontender, Nondistended, no guarding/rebound  :   Deferred  EXT:   CHAMPION x 4, no edema, no calf tenderness, no swelling, no bony tenderness, no warmth or redness, no crepitus, no obvious deformity, L3 paraspinal tenderness with limited ROM in the back secondary to pain  LYMPH:  no gross adenopathy  NEURO:  CN II-XII grossly intact, no obvious motor/sensory deficit, no tremor  PSYCH:   no SI/HI, no anxiety, nl mood/affect, nl judgement/thought process  SKIN:  Warm, dry, intact, no rashes/lesions or masses, nl color, no pallor     Tests     Labs Reviewed - No data to display  X-Ray Lumbar Spine Ap And Lateral   Final Result      Noting limitation above, no definite evidence of acute fracture or traumatic subluxation.         Electronically signed by: Isaiah Acevedo   Date:    06/03/2022   Time:    11:54        PROCEDURE(S):  NONE      MEDICAL DECISION MAKING:  History supplemented by old records which were checked/reviewed in EMR: Patient evaluated for DM on June 1, 2022.    51 y.o. male with multiple co-morbidities presents to the ED with a chief complaint of back pain s/p fall in the grocery store 2 days ago. Exam revealed L3 paraspinal tenderness and limited ROM of the back secondary to pain.       Back pain differential diagnosis includes but not exclusive to: sprain/strain, fracture, contusion, hematoma, tendonitis, arthritis, malingering.  Unlikely cauda equina, AAA, abscess, cellulitis or DVT.    Treatment plan includes history, physical exam, cardiac monitoring, imaging studies, and supportive care.      ED Course   MDM  Reviewed: previous chart, nursing note and vitals  Reviewed previous: x-ray  Interpretation: x-ray      ED Course as of 06/07/22 1012   Fri Jun 03, 2022   1206 X-Ray Lumbar Spine Ap And  Lateral  No acute fx or dislocation [NO]      ED Course User Index  [NO] Ness Castillo MD     REASSESSMENT: Patient is tolerating p.o. and ambulating with steady gait.   Sx improved after treatment with:   Medications   dexAMETHasone tablet 4 mg (4 mg Oral Given 6/3/22 1134)   cyclobenzaprine tablet 10 mg (10 mg Oral Given 6/3/22 1134)   oxyCODONE-acetaminophen  mg per tablet 1 tablet (1 tablet Oral Given 6/3/22 1134)      The results of physical exam and imaging findings were reviewed with the patient. This discussion included but not exclusive to the risk to the patient due to the potential underlying pathology, the testing that was required to make the diagnosis, and the treatment administered or prescribed.      Pt agrees with assessment, disposition and treatment plan.   Patient is amenable to discharge. Precautions for return discussed at length.  Patient informed and understands should immediately return to emergency department  with persistent or worsening symptoms or any other concerns.  Discharge and follow-up instructions discussed with the patient who expressed understanding.  A printed After Visit Summary,  relating to diagnosis, concerns, and/or associated differentials, was given to the patient. All questions asked and answered to the satisfaction of the patient.     For further evaluation, patient will follow up outpatient with:    Follow-up Information     primary care physician. Call in 2 days.    Why: As needed, If symptoms worsen                       PRESCRIPTION GIVEN:  Discharge Medication List as of 6/3/2022 12:27 PM      START taking these medications    Details   acetaminophen (TYLENOL) 500 MG tablet Take 2 tablets (1,000 mg total) by mouth every 6 (six) hours as needed for Pain., Starting Fri 6/3/2022, Print      LIDOcaine (LIDODERM) 5 % Place 1 patch onto the skin once daily. Remove & Discard patch within 12 hours or as directed by MD, Starting Fri 6/3/2022, Normal       naproxen (NAPROSYN) 500 MG tablet Take 1 tablet (500 mg total) by mouth 2 (two) times daily as needed (Pain)., Starting Fri 6/3/2022, Print           Discharge Medication List as of 6/3/2022 12:27 PM        Clinical Impression     1. Acute right-sided low back pain with right-sided sciatica      Disposition: Discharged to home under stable conditions.    SCRIBE #1 NOTE: I, Edith Berkowitz, am scribing for, and in the presence of,  Ness Castillo MD. I have scribed the following portions of the note - Other sections scribed: HPI, ROS, PE.       I, Dr. Ness Castillo, personally performed the services described in this documentation. All medical record entries made by the scribe were at my direction and in my presence.  I have reviewed the chart and agree that the record reflects my personal performance and is accurate and complete. Ness Castillo MD.      _____________________________________________  Called patient at [phone] on 06/07/2022 to follow up on care.  Patient states symptoms are better and has ability to follow up with primary care physician. Given return precautions. All questions asked and answered to the satisfaction of the patient. Patient is amenable to plan of care.         Ness Castillo MD  06/07/22 3145

## 2022-06-03 NOTE — Clinical Note
"Sarbjit Boseadan Stevens was seen and treated in our emergency department on 6/3/2022.  He may return to work on 06/06/2022.  Activities as tolerated     If you have any questions or concerns, please don't hesitate to call.      Ness Castillo MD"

## 2022-06-03 NOTE — ED TRIAGE NOTES
Pt reports right lower back pain radiating down right leg and up right shoulder x 2 days. Pt states while pushing the grocery store cart his right foot stepped on the floor brass plate which caused the brass plate to lift up then caused pt to twist his right leg. Denies pain medication PTA.